# Patient Record
(demographics unavailable — no encounter records)

---

## 2024-10-07 NOTE — ASSESSMENT
[FreeTextEntry1] :   73 year old with hx of anxiety, depression, OA hip , HLD, left rotator cuff tear , emphysema, smoker, 2 Prior aspirations behind left knee 12/2023 from bug bite ( was on Abx), facial cellulitis      Following up for PMR  Patient started having worsening joint pain in around Jan 2024   He reports he started having shoulder, arm pain, and hip pain around this time. Regarding the hip pain, he went to see orthopedics and xrays shows moderate OA and he was given medrol dose pack which helped his symptoms. He did have shoulder pain during this time mostly in left shoulder. He has chronic rotator cuff tear but this time the shoulder pain was not similar to his chronic pain. He had full ROM of patient however in the morning he felt stiffness in his left > right shoulder, kevin if he slept on that side.   No significant decrease in ROM. no difficulty doing tasks such as reaching over head   Rheumatological work up with low positive RF   Elevated inflammatory markers   Imaging with hand/wrist mild to moderate OA, Mild osteoarthritis of the bilateral glenohumeral Advanced left-sided acromioclavicular arthrosis, , calcific tendinosis, Enthesopathic changes along the medial/lateral epicondyles bilaterally, cervical spine arthritis, left elbow with Thickening of the ulnar nerve both proximal to and throughout the cubital tunnel without mass lesion or cyst within the tunnel.   He has elevated inflammatory markers in the setting of infections ( leg infection, facial cellulitis but it is also possible he has a component of inflammatory arthritis that is causing high ESR/CRP )   He has b/l hip pain and shoulder pain (left> right) which can be due to PMR. Work up did reveal hip OA and he has chronic left shoulder rotator cuff tear and workup showed arthritis L>R so there might be a mechanical component. He doesnt have consistent stiffness with ROM of these joints however does reports symptoms worse in AM      His RF is low positive but his symptoms are not entirely consistent with RA. Smoking can cause positive RF. On exam, workup, and history he doesn't have synovitis.  Inflammatory markers have trended down with resolution of the infectio -regarding left elbow cubital tunnel syndrome/carpel tunnel syndrome follow up with ortho; has had injection by them 4/2024 -he did a trial of of low-dose prednisone for possible PMR given sudden onset of hip shoulder pain (although improved without prednisone but might be due to meloxicam) as well as elevated inflammatory markers (might have been due to his recurrent infections but possible inflammatory arthritis component can be entertained)...  3/28: prednisone 15m for 4 weeks, now on 10mg prednisone since around 4/28 patient reports he has been taking it every other day because he feels the prednisone is upsetting his stomach; no PMR symptoms on days he does not take it. then did 10 mg prednisone for 3 more weeks, taper to 7.5 mg prednisone daily for 4 more weeks, then take 5 mg 4 weeks,  then taper to 2.5mg prednisone (2 months), then 1mg prednisone for 1 month and then stop. He is now off prednisone without recurrent symptoms.  Denies GCA symptoms  -Advised on smoking cessation  - Advised on routine malignancy screening  - Continue to follow up with ortho as needed for shoulder and hip OA -Continue to follow up with ortho as needed for cubital/carpel tunnel syndrome. Reports no symptoms since injection  -Educated on symptoms to monitor for  patient agrees with plan  Total time spent in review of patient history, clinical exam, management, counseling, and plan of care: 30min

## 2024-10-07 NOTE — HISTORY OF PRESENT ILLNESS
[FreeTextEntry1] : 73 year old with hx of anxiety, depression, OA hip , HLD, left rotator cuff tear , emphysema, smoker, 2 Prior aspirations behind left knee 12/2023 from bug bite ( was on Abx), facial cellulitis         As per initial note (3/7/24):   Coming for eval of arthralgia  Patient started having worsening joint pain since Jan 2024  He reports he started having shoulder, arm pain, and hip pain around this time. Regarding the hip pain, he went to see orthopedics and xrays shows moderate OA and he was given medrol dose pack which helped his symptoms. He did have shoulder pain during this time mostly in left shoulder. He has chronic rotator cuff tear but this time the shoulder pain was not similar to his chronic pain. He had full ROM of patient however in the morning he felt stiffness in his left > right shoulder, kevin if he slept on that side.  He reports one episode of left index finger swelling however he has not had had hand/wrist pain/ joint swelling otherwise He reports he does have left arm pain with numbness and tingling from his shoulder down to his hands on and off. Some neck pain No significant decrease in ROM. no difficulty doing tasks such as reaching over head.  Hip pain now improved but sometimes with difficulty getting up  Also more fatigue than usual.    Rheumatological work up with low positive RF (likely due to being smoker) Elevated inflammatory markers in the setting of cellulitis at time of blood work   Imaging with hand/wrist mild to moderate OA, Mild osteoarthritis of the bilateral glenohumeral Advanced left-sided acromioclavicular arthrosis, , calcific tendinosis, Enthesopathic changes along the medial/lateral epicondyles bilaterally, cervical spine arthritis, left elbow with Thickening of the ulnar nerve both proximal to and throughout the cubital tunnel without mass lesion or cyst within the tunnel.    Did trial of prednisone given PMR on differential.  Patient reports significant improvement in symptoms after starting prednisone 15 mg. Given response, plan was to treat as PMR.    3/28: prednisone 15m for 4 weeks, then on 10mg prednisone since around 4/28 patient reports he has been taking it every other day because he feels the prednisone is upsetting his stomach; no PMR symptoms on days he does not take it. He is also taking a PPI  Then he tapered to 7.5mg prednisone for 4 weeks followed by 5mg for 4 weeks, ...  Since last visit, he tapered from 5mg to 2.5mg prednisone (2 months), then 1mg prednisone for 1 month and then stop.   He has been off prednisone for over a week. He has no recurrent or worsening symptoms. He followed with ortho and had EMG showing mod L CTS, mild L CuTS...sp carpel tunnel injection 4/2024 with improvement in symptoms      6/29/24 ESR 11, normal CMP, CBC   3/25/24: ESR 54, CRP 45   3/7/24     CRP 69   Normal/neg: CCP, 14.33 eta protein, uric acid,hep B/C serologies, SPEP, immunofixation   1/30/24  RF 17, ESR 54, CK 42 CBC, CMP         3/2024   XR hand/wrist/shoulder    No fracture or dislocation. Carpal arcs maintain anatomic alignment. Moderate osteoarthritis of the left first and mild osteoarthritis of the right first metacarpophalangeal joint. Mild osteoarthritis of the bilateral triscaphe joints as well as bilateral second distal interphalangeal joints. The bilateral humeral heads are well situated within the glenoid. Mild osteoarthritis of the bilateral glenohumeral and acromioclavicular joints, left greater than right. There is an 8 mm hyperdense deposit at the superior humeral head possibly corresponding hydroxyapatite deposition and underlying calcific tendinosis.       XR elbow:  Evaluation for fracture and joint effusion is limited due to nonstandard lateral views of the elbows bilaterally. If there is a clinical history of trauma or clinical concern for fracture, repeat 3 view bilateral elbow radiographs are recommended for further evaluation. No dislocation. Mild spurring along the coronoid process of the ulna bilaterally. Enthesopathic changes along the medial/lateral epicondyles bilaterally. Joint spaces are otherwise relatively preserved. Radiopaque density seen along the volar aspect of the proximal right forearm, measuring up to 4 mm, which may be overlying the patient or represent foreign body within the soft tissues. Clinical correlation recommended.  XR cervical spine     No vertebral fracture. There is minimal retrolisthesis of C4 on C5. Moderate to severe osteoarthritic changes are seen. Prominent osteophytes are seen at multiple levels. Mild disc space narrowing at C4-C5 and at C6-C7. There is bilateral exiting neural foraminal narrowing at C4-C7. The odontoid, as visualized is unremarkable. The visualized soft tissue structures are normal.      Ultrasound Shoulder/hand/wrist/left elbow  BILATERAL HANDS AND WRISTS: No effusions, synovial hypertrophy, or synovial hyperemia. Mild to moderate basal and interphalangeal joints predominant productive arthrosis. Flexor and extensor tendons are intact without tear or tenosynovitis. No hypervascular mass lesion or drainable encapsulated fluid collection identified. Bilateral shoulders: No or glenohumeral effusions, synovial hypertrophy, or synovial hyperemia. Mild to moderate right-sided and moderate to severe left-sided chronic clavicular arthrosis with synovial hypertrophy/capsular hypertrophy, but without effusions or synovial hyperemia. Left elbow: Small volume effusion disproportionately distending the olecranon fossa, but without synovial hypertrophy or synovial hyperemia. Moderate productive arthrosis. Thickening of the ulnar nerve both proximal to and throughout the cubital tunnel without mass lesion or cyst within the tunnel. Median, common radial, and proximal posterior intraosseous nerves are normal in size and echogenicity.    IMPRESSION: 1. No active synovitis of bilateral hands, wrists, and shoulders. 2. Advanced left-sided acromioclavicular arthrosis. 3. Moderate left elbow arthrosis with small reactive effusion, but without active synovitis. 4. Sonographic findings suggestive of left cubital tunnel syndrome in the proper clinical setting. Correlate for possible ulnar neuritis.     Per ortho note:      1/2024  Pelvis 1 or 2 view in the anteroposterior views:  Pelvis Comments: Right Hip: Mild to moderate narrowing with bone spurs. Left Hip: Moderate narrowing with bone spurs.

## 2024-10-07 NOTE — PHYSICAL EXAM
[TextEntry] : GENERAL: Appears in no acute distress HEENT: EOMI. No conjunctival erythema. Moist mucous membranes. No scalp or temple tenderness NECK: Supple, no cervical lymphadenopathy CARDIOVASCULAR: RRR. S1, S2 auscultated. PULMONARY: Clear to auscultation b/l,  MSK: No active synovitis, swelling, erythema, or warmth. No joint tenderness to palpation. No Bouchards or Heberdens nodes No deformities. knee crepitus. Normal ROM of neck, , b/l upper and lower extremities. SKIN: No lesions or rashes NEURO: No focal deficits. Motor strength 5/5 in major muscle groups of b/l UE and LE. Sensation to soft touch intact in major dermatomes of b/l UE and LE. PSYCH: Normal affect and thought process.

## 2024-10-10 NOTE — PHYSICAL EXAM
[Normal Appearance] : normal appearance [Well Groomed] : well groomed [General Appearance - In No Acute Distress] : no acute distress [Respiration, Rhythm And Depth] : normal respiratory rhythm and effort [Exaggerated Use Of Accessory Muscles For Inspiration] : no accessory muscle use [Abdomen Soft] : soft [Abdomen Tenderness] : non-tender [Costovertebral Angle Tenderness] : no ~M costovertebral angle tenderness [Urinary Bladder Findings] : the bladder was normal on palpation [Normal Station and Gait] : the gait and station were normal for the patient's age [] : no rash [No Focal Deficits] : no focal deficits [Oriented To Time, Place, And Person] : oriented to person, place, and time [Affect] : the affect was normal [Mood] : the mood was normal [Chaperone Present] : A chaperone was present in the examining room during all aspects of the physical examination [FreeTextEntry2] : Deirdre Turcios MA

## 2024-10-10 NOTE — HISTORY OF PRESENT ILLNESS
[FreeTextEntry1] : Mr. ADKINS is a 73 year-year-old White  M consulting for urinary symptoms. Endorsing mild moderate severe LUTS for (see IPSS), both voiding and storage symptoms.  Most bothered by nocturia x 4. No family history of Prostate cancer. Denies fevers, chills, flank pain, hematuria, AUR.

## 2024-10-10 NOTE — ASSESSMENT
[FreeTextEntry1] : 73-year-old male with lower urinary tract symptoms.  Discussed tamsulosin trial, patient amenable.  PVR 15 cc. Follow-up uroflow PVR IPSS.  Today we discussed that BPH is a condition of prostatic enlargement that blocks the bladder outlet and can make it difficult to void. Over time this can manifest itself as urinary frequency, urgency, straining to void, poor stream, nocturia, high PVR's and retention. While BPH is related to prostate volume (increasing prostate volume results in increased likelihood of complications from BPH), there are many men with "normal volume" prostates that have symptoms of occlusion. We also discussed the effects that BPH can cause on the bladder: BPH can cause detrusor overactivity which results in urgency and frequency and in some cases, incontinence.    We then discussed the treatment options for BPH. We generally start with medical management and proceed to surgery if necessary. Alpha blockers act to relax the prostate while 5-alpha reductase inhibitors (5-Citlaly) shrink the prostate. 5-THEE's are most effective in patients who have prostates that are >40gm. The former are fairly fast acting (in a matter of days-weeks) while the latter can take up to 3-6 months to take effect. PDE5 inhibitors have been shown in several randomized trials to be beneficial in improving symptom scores in patients with LUTS/BPH.

## 2024-10-23 NOTE — DISCUSSION/SUMMARY
[FreeTextEntry1] : Mr. June has been incidentally detected as having stable coronary artery calcifications and a mildly dilated ascending aorta on follow-up surveillance chest CT scans (he has a history of mediastinal and hilar adenopathy, as described). The patient has a longstanding history of cigarette smoking.  Since his previous visit here on 4/23/2024, he has noted a similar degree of dyspnea on exertion in association with moderate activities. His cardiac examination today is unremarkable. His blood pressure reading today is normal. His electrocardiogram today reveals sinus rhythm at a rate of 66 bpm beats per minute with a right bundle branch block pattern and non-diagnostic repolarization abnormalities, essentially unchanged from his previous office tracing.  I have reviewed the findings of the exercise stress study of 4/30/2024 and the echocardiogram of 4/30/2024 in detail with the patient today.  I have reviewed the findings of the chest CT scan of 12/26/2023 with the patient today, pointing out to him that the study revealed the diameter of the ascending aorta to be stable.  I have reviewed the findings of the blood test report of 6/29/2024 in detail with the patient today, and I have instructed him to continue Lipitor 40 mg daily and Zetia 10 mg daily for the time being.  The importance of smoking cessation and cessation of alcohol consumption was again strongly emphasized to the patient today.   I have asked the patient to call me if he should have any questions or problems pertaining to these matters, and especially if he should experience any concerning symptoms. I have otherwise asked him to return to the office for follow-up cardiac evaluation in 6 months, provided he remains clinically stable in the interim.  Follow-up echocardiography will be arranged at that time for reassessment of the diameter of the aortic root and ascending aorta. [EKG obtained to assist in diagnosis and management of assessed problem(s)] : EKG obtained to assist in diagnosis and management of assessed problem(s)

## 2024-10-23 NOTE — PHYSICAL EXAM
[General Appearance - In No Acute Distress] : no acute distress [Normal Conjunctiva] : the conjunctiva exhibited no abnormalities [No Oral Pallor] : no oral pallor [Respiration, Rhythm And Depth] : normal respiratory rhythm and effort [Auscultation Breath Sounds / Voice Sounds] : lungs were clear to auscultation bilaterally [Bowel Sounds] : normal bowel sounds [Abdomen Tenderness] : non-tender [Abnormal Walk] : normal gait [Cyanosis, Localized] : no localized cyanosis [] : no rash [Oriented To Time, Place, And Person] : oriented to person, place, and time [Not Palpable] : not palpable [No Precordial Heave] : no precordial heave was noted [Normal Rate] : normal [Rhythm Regular] : regular [Normal S1] : normal S1 [Normal S2] : normal S2 [No Gallop] : no gallop heard [No Murmur] : no murmurs heard [2+] : left 2+ [1+] : left 1+ [No Abnormalities] : the abdominal aorta was not enlarged and no bruit was heard [No Pitting Edema] : no pitting edema present [FreeTextEntry1] : no JVD is appreciated at a 45 angle [Apical Thrill] : no thrill palpable at the apex [Click] : no click [Pericardial Rub] : no pericardial rub [Right Carotid Bruit] : no bruit heard over the right carotid [Left Carotid Bruit] : no bruit heard over the left carotid

## 2024-10-23 NOTE — REVIEW OF SYSTEMS
[Anxiety] : anxiety [Under Stress] : under stress [Negative] : Heme/Lymph [FreeTextEntry5] : see HPI

## 2024-10-23 NOTE — CARDIOLOGY SUMMARY
[de-identified] : 10/23/24 -sinus rhythm at a rate of 66 bpm.  Rightward axis.  Right bundle branch block.  Nondiagnostic repolarization abnormalities.

## 2024-10-23 NOTE — HISTORY OF PRESENT ILLNESS
[FreeTextEntry1] : Mr. Jordan June presented to the office today for follow-up cardiac evaluation. I last evaluated the patient in the office on 4/23/2024.  The patient is a 73-year-old male with a history of atypical chest discomfort, coronary artery calcification, a right bundle branch block pattern on his electrocardiogram, a dyslipidemia, pre-diabetes, a mildly dilated aortic root/ascending aorta, mild aortic insufficiency, GERD (including Gama's esophagus), gastritis, diverticulosis, benign colon polyps (5/16/2023), fatty liver, vitamin B12 deficiency, BPH, Peronie's disease, COPD, mediastinal/hilar adenopathy (follicular hyperplasia), anxiety/depression, osteoarthritis, left carpal tunnel syndrome, left mild cubital syndrome, and suspected polymyalgia rheumatica (tapering prednisone course initiated 3/2024, completed 9/2024).  The patient has been stable from a cardiac symptomatic standpoint since his previous visit here on 4/23/2024. Specifically, he has noted a similar degree of dyspnea on exertion in association with moderate activities.  He does not describe having experienced chest discomfort in association with his activities. He has not noted orthopnea, paroxysmal nocturnal dyspnea, or lower extremity edema. He has not experienced any episodes of palpitations, presyncope or syncope.  The patient was diagnosed as having suspected polymyalgia rheumatica on 3/7/2024.  Laboratory studies performed on 3/25/2024 revealed ESR 54 and CRP 45.  A tapering course of prednisone was initiated in March 2024 and tapered off as of 9/2024.  At the time of a previous visit here on 2/7/20, I initiated statin therapy in the form of Crestor. The patient telephoned me on 2/24/20, and described having been experiencing GI intolerance to the Crestor. He was switched to Lipitor 10 mg daily, and the dosage was gradually increased to 80 mg daily.  He telephoned me on 4/10/2021, stating that he was unable to tolerate the 80 mg dosage of Lipitor, and the dosage was reduced back to 40 mg daily at that time.  Zetia 10 mg daily was subsequently added to the medication regimen.  The patient has a history of mediastinal and hilar adenopathy, for which he underwent bronchoscopy and EBUS on 9/11/13, with the biopsy revealing reactive lymph nodes. He subsequently underwent mediastinoscopy on 11/14/13, with the lymph node biopsy revealing reactive follicular hyperplasia. He has been undergoing periodic surveillance CT scanning of the chest since that time. These CT scans have revealed stable coronary artery calcifications and a mildly dilated ascending aorta (measuring up to 4.3 cm).  The follow-up study was performed on 12/26/2023, revealing emphysematous changes with bronchial wall thickening, more prominent peripheral findings of bronchiolectasis compared with previous studies, a stable appearance of previously noted calcified and non-calcified nodularity, slightly more pronounced mediastinal lymph node prominence when compared with previous studies, and stable aneurysmal dilatation of the ascending thoracic aorta (up to 4.2 cm in diameter).  Review of systems is significant for the patient reporting having been under considerable stress, noting that his daughter has had a heroin addiction, and he is presently raising his 8-year-old grandson with his wife. Consequently, the patient admits to having been consuming alcohol in the form of 4-5 beers and a vodka/soda per night. He has consulted with a psychiatrist for management of anxiety/depression.  As far as risk factors for coronary artery disease are concerned, the patient has a dyslipidemia and pre-diabetes. He does not have a history of hypertension. He had been smoking an average of 1-1/2 packs per day since the age of 18, having cut down to three quarters of a pack per day over the past few years. He does not have a known immediate family history of premature coronary artery disease.  Laboratory studies performed on 6/29/2024 (on Lipitor 40 mg daily and Zetia 10 mg daily) revealed cholesterol 132, triglycerides 109, HDL 55, and calculated LDL 57.  The liver chemistries were normal.  The BUN and creatinine were 12 and 0.9, respectively.  The potassium level was 4.5.  The glucose level was 101 and the hemoglobin A1c level was 5.6%.  The hemoglobin and hematocrit were 15.8 and 47.1, respectively.  The TSH level was 1.27.  The vitamin D level was 30.8.  Exercise stress testing performed on 4/30/2024 revealed the patient to exhibit diminished exercise tolerance secondary to the development of lower extremity fatigue.  This study was negative for the inducement of cardiac symptoms, electrocardiographic evidence of myocardial ischemia, or significant arrhythmias.  Nuclear stress testing performed on 6/20/2022 revealed the patient to exhibit reduced exercise tolerance without the inducement of cardiac symptoms.  There was an excessive heart rate response to exercise, suggestive of decreased cardiovascular conditioning.  There was a baseline right bundle branch block pattern, without the inducement of electrocardiographic evidence of myocardial ischemia.  Rare ventricular premature contractions were exhibited throughout the study.  The cardiac imaging portion of the study revealed normal left ventricular myocardial perfusion, allowing for artifactual attenuation of activity involving the inferior region secondary to overlying diaphragm.  Left ventricular wall motion was normal, with a calculated ejection fraction of 66%.  Echocardiography most recently performed on 4/30/2024 revealed the right ventricle to be mildly enlarged with normal left ventricular systolic function.  The remainder of the cardiac chambers were normal in dimension.  Left ventricular wall thickness and wall motion were normal, with an estimated ejection fraction of 60%.  Normal left ventricular diastolic function was demonstrated.  The aortic root was noted to be mildly dilated, measuring 4.2 cm at the level of the sinuses of Valsalva.  The ascending aorta was noted to be mildly dilated, measuring 3.9 cm in diameter.  Mild to moderate aortic regurgitation was demonstrated.

## 2024-11-13 NOTE — HISTORY OF PRESENT ILLNESS
[FreeTextEntry1] : Mr. ADKINS is a 73 year-year-old White M consulting for urinary symptoms. Endorsing mild moderate severe LUTS for (see IPSS), both voiding and storage symptoms.  Most bothered by nocturia x 4. Drinks 3 beers in the evening. No family history of Prostate cancer.  11/13/24 Marginal improvement with Tamsulosin, still bothered by frequency and Nocturia.  US: Prostate 26cc with cystic BPH vs ureterocele? Denies fevers, chills, flank pain, hematuria, AUR.

## 2024-11-13 NOTE — PHYSICAL EXAM
[Normal Appearance] : normal appearance [Well Groomed] : well groomed [General Appearance - In No Acute Distress] : no acute distress [] : no respiratory distress [Respiration, Rhythm And Depth] : normal respiratory rhythm and effort [Exaggerated Use Of Accessory Muscles For Inspiration] : no accessory muscle use [Costovertebral Angle Tenderness] : no ~M costovertebral angle tenderness [Urinary Bladder Findings] : the bladder was normal on palpation [Normal Station and Gait] : the gait and station were normal for the patient's age [No Focal Deficits] : no focal deficits [Oriented To Time, Place, And Person] : oriented to person, place, and time [Affect] : the affect was normal [Mood] : the mood was normal [Abdomen Soft] : soft [Abdomen Tenderness] : non-tender

## 2024-11-13 NOTE — ASSESSMENT
[FreeTextEntry1] : 73-year-old male with lower urinary tract symptoms.  Marginal improvement with Tamsulosin, still bothered by frequency and Nocturia. Discussed behavioral modifications in the evenings. Reviewed bladder irritants including alcohol. US: Prostate 26cc with cystic BPH vs ureterocele? PVR 0 cc. Discussed cystoscopy and dedicated prostate US given US findings. Patient agrees with this plan. Follow-up uroflow PVR IPSS.  Today we discussed that BPH is a condition of prostatic enlargement that blocks the bladder outlet and can make it difficult to void. Over time this can manifest itself as urinary frequency, urgency, straining to void, poor stream, nocturia, high PVR's and retention. While BPH is related to prostate volume (increasing prostate volume results in increased likelihood of complications from BPH), there are many men with "normal volume" prostates that have symptoms of occlusion. We also discussed the effects that BPH can cause on the bladder: BPH can cause detrusor overactivity which results in urgency and frequency and in some cases, incontinence.   Cystoscopy: The patient understands that cystoscopy involves putting a camera in the urethra and bladder. The risks include bleeding and infection. This test would rule out potential causes of frequency such as urethral strictures.

## 2024-11-20 NOTE — ASSESSMENT
[FreeTextEntry1] : 73-year-old male with lower urinary tract symptoms.  Marginal improvement with Tamsulosin, still bothered by frequency and Nocturia. Discussed continuation of Tamsulosin and solifenacin trial, patient amenable.  RBUS: Prostate 26cc with cystic BPH vs ureterocele?  TUSP: Prostate 15cc, no prostatic cysts.  Cystoscopy: High bladder neck, mild bladder trabeculation, no ureterocele Follow-up uroflow PVR IPSS.  Today we discussed that BPH is a condition of prostatic enlargement that blocks the bladder outlet and can make it difficult to void. Over time this can manifest itself as urinary frequency, urgency, straining to void, poor stream, nocturia, high PVR's and retention. While BPH is related to prostate volume (increasing prostate volume results in increased likelihood of complications from BPH), there are many men with "normal volume" prostates that have symptoms of occlusion. We also discussed the effects that BPH can cause on the bladder: BPH can cause detrusor overactivity which results in urgency and frequency and in some cases, incontinence.    We then discussed the treatment options for BPH. We generally start with medical management and proceed to surgery if necessary. Alpha blockers act to relax the prostate while 5-alpha reductase inhibitors (5-Citlaly) shrink the prostate. 5-THEE's are most effective in patients who have prostates that are >40gm. The former are fairly fast acting (in a matter of days-weeks) while the latter can take up to 3-6 months to take effect. PDE5 inhibitors have been shown in several randomized trials to be beneficial in improving symptom scores in patients with LUTS/BPH.   Anticholinergics and Myrbetriq merely treat the bladder symptoms that are caused by the sequelae of outlet obstruction. They "relax" the bladder and they can weaken the urine stream and make retention worse or precipitate retention. They do not address the underlying cause of overactivity in this setting but do ease bladder spasms and can help with urgency and frequency. They are useful in certain carefully selected patients.

## 2024-11-20 NOTE — HISTORY OF PRESENT ILLNESS
HM updated with colonoscopy report.   [FreeTextEntry1] : Mr. ADKINS is a 73 year-year-old White M consulting for urinary symptoms. Endorsing mild moderate severe LUTS for (see IPSS), both voiding and storage symptoms.  Most bothered by nocturia x 4. Drinks 3 beers in the evening. No family history of Prostate cancer.  11/20/24 Marginal improvement with Tamsulosin, still bothered by frequency and Nocturia.  US: Prostate 26cc with cystic BPH vs ureterocele? TUSP: Prostate 15cc, no prostatic cysts.  Cystoscopy: High bladder neck, mild bladder trabeculation, no ureterocele Denies fevers, chills, flank pain, hematuria, AUR.

## 2024-11-20 NOTE — PHYSICAL EXAM
[Normal Appearance] : normal appearance [Well Groomed] : well groomed [General Appearance - In No Acute Distress] : no acute distress [] : no respiratory distress [Respiration, Rhythm And Depth] : normal respiratory rhythm and effort [Exaggerated Use Of Accessory Muscles For Inspiration] : no accessory muscle use [Abdomen Soft] : soft [Abdomen Tenderness] : non-tender [Costovertebral Angle Tenderness] : no ~M costovertebral angle tenderness [Urethral Meatus] : meatus normal [Urinary Bladder Findings] : the bladder was normal on palpation [Testes Tenderness] : no tenderness of the testes [Testes Mass (___cm)] : there were no testicular masses [Normal Station and Gait] : the gait and station were normal for the patient's age [No Focal Deficits] : no focal deficits [Oriented To Time, Place, And Person] : oriented to person, place, and time [Affect] : the affect was normal [Mood] : the mood was normal [Chaperone Present] : A chaperone was present in the examining room during all aspects of the physical examination [FreeTextEntry2] : Tiffany

## 2025-01-04 NOTE — ASSESSMENT
[FreeTextEntry1] : 73-year-old male with lower urinary tract symptoms. Improvement of symptoms with tamsulosin 0.4 mg and Solifenacin 5 mg.  Patient wishes to continue. RBUS: Prostate 26cc with cystic BPH vs ureterocele?  TUSP: Prostate 15cc, no prostatic cysts.  Cystoscopy: High bladder neck, mild bladder trabeculation, no ureterocele Follow-up uroflow PVR IPSS.  Today we discussed that BPH is a condition of prostatic enlargement that blocks the bladder outlet and can make it difficult to void. Over time this can manifest itself as urinary frequency, urgency, straining to void, poor stream, nocturia, high PVR's and retention. While BPH is related to prostate volume (increasing prostate volume results in increased likelihood of complications from BPH), there are many men with "normal volume" prostates that have symptoms of occlusion. We also discussed the effects that BPH can cause on the bladder: BPH can cause detrusor overactivity which results in urgency and frequency and in some cases, incontinence.    We then discussed the treatment options for BPH. We generally start with medical management and proceed to surgery if necessary. Alpha blockers act to relax the prostate while 5-alpha reductase inhibitors (5-Citlaly) shrink the prostate. 5-THEE's are most effective in patients who have prostates that are >40gm. The former are fairly fast acting (in a matter of days-weeks) while the latter can take up to 3-6 months to take effect. PDE5 inhibitors have been shown in several randomized trials to be beneficial in improving symptom scores in patients with LUTS/BPH.   Anticholinergics and Myrbetriq merely treat the bladder symptoms that are caused by the sequelae of outlet obstruction. They "relax" the bladder and they can weaken the urine stream and make retention worse or precipitate retention. They do not address the underlying cause of overactivity in this setting but do ease bladder spasms and can help with urgency and frequency. They are useful in certain carefully selected patients.

## 2025-01-04 NOTE — HISTORY OF PRESENT ILLNESS
[FreeTextEntry1] : Mr. ADKINS is a 73 year-year-old White M consulting for urinary symptoms. Endorsing mild moderate severe LUTS for (see IPSS), both voiding and storage symptoms.  Most bothered by nocturia x 4. Drinks 3 beers in the evening. No family history of Prostate cancer.  1/3/24 Improvement of symptoms with tamsulosin 0.4 mg and Solifenacin 5 mg US: Prostate 26cc with cystic BPH vs ureterocele? TUSP: Prostate 15cc, no prostatic cysts.  Cystoscopy: High bladder neck, mild bladder trabeculation, no ureterocele Denies fevers, chills, flank pain, hematuria, AUR.

## 2025-01-04 NOTE — PHYSICAL EXAM
[Normal Appearance] : normal appearance [Well Groomed] : well groomed [General Appearance - In No Acute Distress] : no acute distress [] : no respiratory distress [Respiration, Rhythm And Depth] : normal respiratory rhythm and effort [Exaggerated Use Of Accessory Muscles For Inspiration] : no accessory muscle use [Abdomen Soft] : soft [Abdomen Tenderness] : non-tender [Costovertebral Angle Tenderness] : no ~M costovertebral angle tenderness [Urethral Meatus] : meatus normal [Testes Tenderness] : no tenderness of the testes [Normal Station and Gait] : the gait and station were normal for the patient's age [Testes Mass (___cm)] : there were no testicular masses [No Focal Deficits] : no focal deficits [Oriented To Time, Place, And Person] : oriented to person, place, and time [Affect] : the affect was normal [Mood] : the mood was normal [Chaperone Present] : A chaperone was present in the examining room during all aspects of the physical examination [FreeTextEntry2] : Tiffany [Urinary Bladder Findings] : the bladder was normal on palpation

## 2025-04-23 NOTE — HISTORY OF PRESENT ILLNESS
[FreeTextEntry1] : Mr. Jordan June presented to the office today for follow-up cardiac evaluation. I last evaluated the patient in the office on 10/23/2024.  The patient is a 74-year-old male with a history of atypical chest discomfort, coronary artery calcification, a right bundle branch block pattern on his electrocardiogram, a dyslipidemia, pre-diabetes, a mildly dilated aortic root/ascending aorta, mild aortic insufficiency, GERD (including Gama's esophagus), gastritis, diverticulosis, benign colon polyps (5/16/2023), fatty liver, vitamin B12 deficiency, BPH, Peronie's disease, COPD, mediastinal/hilar adenopathy (follicular hyperplasia), anxiety/depression, osteoarthritis, left carpal tunnel syndrome, left mild cubital syndrome, and suspected polymyalgia rheumatica (tapering prednisone course initiated 3/2024, completed 9/2024).  The patient has been stable from a cardiac symptomatic standpoint since his previous visit here on 10/23/2024. Specifically, he has noted a similar degree of dyspnea on exertion in association with moderate activities.  He does not describe having experienced chest discomfort in association with his activities. He has not noted orthopnea, paroxysmal nocturnal dyspnea, or lower extremity edema. He has not experienced any episodes of palpitations, presyncope or syncope.  The patient was diagnosed as having suspected polymyalgia rheumatica on 3/7/2024.  Laboratory studies performed on 3/25/2024 revealed ESR 54 and CRP 45.  A tapering course of prednisone was initiated in March 2024 and tapered off as of 9/2024.  At the time of a previous visit here on 2/7/20, I initiated statin therapy in the form of Crestor. The patient telephoned me on 2/24/20, and described having been experiencing GI intolerance to the Crestor. He was switched to Lipitor 10 mg daily, and the dosage was gradually increased to 80 mg daily.  He telephoned me on 4/10/2021, stating that he was unable to tolerate the 80 mg dosage of Lipitor, and the dosage was reduced back to 40 mg daily at that time.  Zetia 10 mg daily was subsequently added to the medication regimen.  The patient has a history of mediastinal and hilar adenopathy, for which he underwent bronchoscopy and EBUS on 9/11/13, with the biopsy revealing reactive lymph nodes. He subsequently underwent mediastinoscopy on 11/14/13, with the lymph node biopsy revealing reactive follicular hyperplasia. He has been undergoing periodic surveillance CT scanning of the chest since that time. These CT scans have revealed stable coronary artery calcifications and a mildly dilated ascending aorta (measuring up to 4.3 cm).  The follow-up study was performed on 12/26/2023, revealing emphysematous changes with bronchial wall thickening, more prominent peripheral findings of bronchiolectasis compared with previous studies, a stable appearance of previously noted calcified and non-calcified nodularity, slightly more pronounced mediastinal lymph node prominence when compared with previous studies, and stable aneurysmal dilatation of the ascending thoracic aorta (up to 4.2 cm in diameter).  Review of systems is significant for the patient reporting having been under considerable stress, noting that his daughter has had a heroin addiction, and he is presently raising his 8-year-old grandson with his wife. Consequently, the patient admits to having been consuming alcohol in the form of 4-5 beers and a vodka/soda per night. He has consulted with a psychiatrist for management of anxiety/depression.  As far as risk factors for coronary artery disease are concerned, the patient has a dyslipidemia and pre-diabetes. He does not have a history of hypertension. He had been smoking an average of 1-1/2 packs per day since the age of 18, having cut down to three quarters of a pack per day over the past few years. He does not have a known immediate family history of premature coronary artery disease.  Laboratory studies performed on 6/29/2024 (on Lipitor 40 mg daily and Zetia 10 mg daily) revealed cholesterol 132, triglycerides 109, HDL 55, and calculated LDL 57.  The liver chemistries were normal.  The BUN and creatinine were 12 and 0.9, respectively.  The potassium level was 4.5.  The glucose level was 101 and the hemoglobin A1c level was 5.6%.  The hemoglobin and hematocrit were 15.8 and 47.1, respectively.  The TSH level was 1.27.  The vitamin D level was 30.8.  Exercise stress testing performed on 4/30/2024 revealed the patient to exhibit diminished exercise tolerance secondary to the development of lower extremity fatigue.  This study was negative for the inducement of cardiac symptoms, electrocardiographic evidence of myocardial ischemia, or significant arrhythmias.  Nuclear stress testing performed on 6/20/2022 revealed the patient to exhibit reduced exercise tolerance without the inducement of cardiac symptoms.  There was an excessive heart rate response to exercise, suggestive of decreased cardiovascular conditioning.  There was a baseline right bundle branch block pattern, without the inducement of electrocardiographic evidence of myocardial ischemia.  Rare ventricular premature contractions were exhibited throughout the study.  The cardiac imaging portion of the study revealed normal left ventricular myocardial perfusion, allowing for artifactual attenuation of activity involving the inferior region secondary to overlying diaphragm.  Left ventricular wall motion was normal, with a calculated ejection fraction of 66%.  Echocardiography most recently performed on 4/30/2024 revealed the right ventricle to be mildly enlarged with normal left ventricular systolic function.  The remainder of the cardiac chambers were normal in dimension.  Left ventricular wall thickness and wall motion were normal, with an estimated ejection fraction of 60%.  Normal left ventricular diastolic function was demonstrated.  The aortic root was noted to be mildly dilated, measuring 4.2 cm at the level of the sinuses of Valsalva.  The ascending aorta was noted to be mildly dilated, measuring 3.9 cm in diameter.  Mild to moderate aortic regurgitation was demonstrated.

## 2025-04-23 NOTE — DISCUSSION/SUMMARY
[FreeTextEntry1] : Mr. June has been incidentally detected as having stable coronary artery calcifications and a mildly dilated ascending aorta on follow-up surveillance chest CT scans (he has a history of mediastinal and hilar adenopathy, as described). The patient has a longstanding history of cigarette smoking.  Since his previous visit here on 10/23/2024, he has noted a similar degree of dyspnea on exertion in association with moderate activities. His cardiac examination today is unremarkable. His blood pressure reading today is normal. His electrocardiogram today reveals sinus rhythm at a rate of 72 bpm beats per minute with 1 supraventricular premature contraction, right axis deviation, a right bundle branch block pattern and non-diagnostic repolarization abnormalities, essentially unchanged from his previous office tracing, with the exception of the supraventricular premature contraction exhibited on the present tracing.  I have reviewed the findings of the exercise stress study of 4/30/2024 and the echocardiogram of 4/30/2024 in detail with the patient today.  I am referring the patient for follow-up echocardiography at this point for reassessment of the degree of aortic insufficiency and the diameter of the aortic root/ascending aorta.  The patient is going to make arrangements to have this study performed through our office, and I will telephone him to discuss the findings, once the study has been completed.  I have reviewed the findings of the chest CT scan of 12/26/2023 with the patient today, pointing out to him that the study revealed the diameter of the ascending aorta to be stable.  I have reviewed the findings of the blood test report of 6/29/2024 in detail with the patient today, and I have instructed him to continue Lipitor 40 mg daily and Zetia 10 mg daily for the time being.  I have electronically prescribed a renewal for Zetia to the patient's pharmacy for him today.  I am referring the patient for follow-up fasting blood testing at this point for reassessment.  The importance of smoking cessation and cessation of alcohol consumption was again strongly emphasized to the patient today.   I have asked the patient to call me if he should have any questions or problems pertaining to these matters, and especially if he should experience any concerning symptoms. I have otherwise asked him to return to the office for follow-up cardiac evaluation in 6 months, provided he remains clinically stable in the interim.

## 2025-04-30 NOTE — PHYSICAL EXAM
[No Acute Distress] : no acute distress [Well Nourished] : well nourished [Well Developed] : well developed [Well-Appearing] : well-appearing [Normal Sclera/Conjunctiva] : normal sclera/conjunctiva [PERRL] : pupils equal round and reactive to light [EOMI] : extraocular movements intact [Normal Outer Ear/Nose] : the outer ears and nose were normal in appearance [Normal Oropharynx] : the oropharynx was normal [No JVD] : no jugular venous distention [No Lymphadenopathy] : no lymphadenopathy [Supple] : supple [Thyroid Normal, No Nodules] : the thyroid was normal and there were no nodules present [No Respiratory Distress] : no respiratory distress  [No Accessory Muscle Use] : no accessory muscle use [Clear to Auscultation] : lungs were clear to auscultation bilaterally [Normal Rate] : normal rate  [Regular Rhythm] : with a regular rhythm [Normal S1, S2] : normal S1 and S2 [No Murmur] : no murmur heard [No Carotid Bruits] : no carotid bruits [No Abdominal Bruit] : a ~M bruit was not heard ~T in the abdomen [No Varicosities] : no varicosities [Pedal Pulses Present] : the pedal pulses are present [No Edema] : there was no peripheral edema [No Palpable Aorta] : no palpable aorta [No Extremity Clubbing/Cyanosis] : no extremity clubbing/cyanosis [Soft] : abdomen soft [Non Tender] : non-tender [Non-distended] : non-distended [No Masses] : no abdominal mass palpated [No HSM] : no HSM [Normal Bowel Sounds] : normal bowel sounds [Normal Posterior Cervical Nodes] : no posterior cervical lymphadenopathy [Normal Anterior Cervical Nodes] : no anterior cervical lymphadenopathy [No CVA Tenderness] : no CVA  tenderness [No Spinal Tenderness] : no spinal tenderness [No Joint Swelling] : no joint swelling [Grossly Normal Strength/Tone] : grossly normal strength/tone [No Rash] : no rash [Coordination Grossly Intact] : coordination grossly intact [No Focal Deficits] : no focal deficits [Normal Gait] : normal gait [Deep Tendon Reflexes (DTR)] : deep tendon reflexes were 2+ and symmetric [Normal Affect] : the affect was normal [Normal Insight/Judgement] : insight and judgment were intact [de-identified] : dry mucous membranes

## 2025-04-30 NOTE — HISTORY OF PRESENT ILLNESS
[FreeTextEntry8] : Pt comes in for diarrhea. Sunday night patient and wife had takeout that included steak. Monday morning they both woke up with multiple episodes of diarrhea. Patient estimates 6-7 episodes of diarrhea that day, gas pains, no appetite. Says he did nothing but lay on the couch. Took 2 imodium on Monday. Tuesday his wife's diarrhea resolved but his persisted, but still having gas pains, took another imodium, then took a dicyclomine which helped to calm down his gas pains. Last BM 4pm Tuesday afternoon. Was able to eat some rice last night. His only complaint currently is weakness. No recent abx use. Diarrhea was yellow-colored, not water. No abd pain, nausea, vomiting, fevers, chills, chest pain, SOB, palpitations. Urinating normally. Had 2 beers last night. Trying to drink more water.